# Patient Record
Sex: FEMALE | Race: WHITE
[De-identification: names, ages, dates, MRNs, and addresses within clinical notes are randomized per-mention and may not be internally consistent; named-entity substitution may affect disease eponyms.]

---

## 2021-01-22 ENCOUNTER — HOSPITAL ENCOUNTER (OUTPATIENT)
Dept: HOSPITAL 53 - M WHC | Age: 26
End: 2021-01-22
Attending: OBSTETRICS & GYNECOLOGY
Payer: COMMERCIAL

## 2021-01-22 DIAGNOSIS — N63.10: Primary | ICD-10-CM

## 2021-01-22 NOTE — REP
INDICATION:

PAINFUL RT BREAST LUMP 12:00 POSITION ABOVE AREOLA.



COMPARISON:

None



TECHNIQUE:

Real-time sonographic evaluation of right breast performed.



FINDINGS:

The region of the painful palpable lump at 12 o'clock there is no evidence of a cystic

or solid nodule.  The adjacent retroareolar region is also unremarkable.





IMPRESSION:

BIRADS/ACR category 1, negative ultrasound right breast in the region of painful lump.



RECOMMENDATION:

Clinical correlation and follow-up recommended.





<Electronically signed by Herminio Munoz > 01/22/21 [id number]